# Patient Record
Sex: FEMALE | Race: WHITE | NOT HISPANIC OR LATINO | URBAN - METROPOLITAN AREA
[De-identification: names, ages, dates, MRNs, and addresses within clinical notes are randomized per-mention and may not be internally consistent; named-entity substitution may affect disease eponyms.]

---

## 2024-04-23 ENCOUNTER — TRANSCRIBE ORDERS (OUTPATIENT)
Dept: ADMINISTRATIVE | Facility: HOSPITAL | Age: 44
End: 2024-04-23
Payer: COMMERCIAL

## 2024-04-23 DIAGNOSIS — Z12.31 VISIT FOR SCREENING MAMMOGRAM: Primary | ICD-10-CM

## 2024-05-03 ENCOUNTER — HOSPITAL ENCOUNTER (OUTPATIENT)
Dept: MAMMOGRAPHY | Facility: HOSPITAL | Age: 44
Discharge: HOME | End: 2024-05-03
Admitting: FAMILY MEDICINE
Payer: COMMERCIAL

## 2024-05-03 DIAGNOSIS — Z12.31 VISIT FOR SCREENING MAMMOGRAM: ICD-10-CM

## 2024-05-03 PROCEDURE — 77067 SCR MAMMO BI INCL CAD: CPT

## 2024-05-03 PROCEDURE — 77063 BREAST TOMOSYNTHESIS BI: CPT

## 2024-05-15 NOTE — PROGRESS NOTES
"Subjective   History of Present Illness: Glenys Cornejo is a 44 y.o. female is being seen for consultation today at the request of TERE Bear for numbness in her left leg that began in December of 2023 after long flight, 17 hours, to [place].  She started to seek attention for the numb and weak left leg that developed because it did not seem to be improving but has improved with physical therapy over the last 3 weeks.  She tells me a month ago she had a profound foot drop on the left but now she can definitely lift the leg against gravity and has resistance in the left foot.  Numbness still does persist on the lateral surface of the left foot.  She denies any back or leg pain.     History of Present Illness    Tobacco Use: Medium Risk (5/17/2024)    Patient History     Smoking Tobacco Use: Former     Smokeless Tobacco Use: Never     Passive Exposure: Not on file        The following portions of the patient's history were reviewed and updated as appropriate: allergies, current medications, past family history, past medical history, past social history, past surgical history and problem list.    Review of Systems    Objective     Vitals:    05/17/24 0920   BP: 120/78   Weight: 76.7 kg (169 lb)   Height: 175.3 cm (69\")     Body mass index is 24.96 kg/m².      Physical Exam  Neurologic Exam    Physical Exam:    CONSTITUTIONAL: This 44 year old  female appears well developed, well-nourished and in no acute distress.  When I entered the room the patient was sitting in the exam chair with her left leg crossed across her right leg comfortably    HEAD & FACE: the head and face are symmetric, normocephalic and atraumatic.    EYES: Inspection of the conjunctivae and lids reveals no swelling, erythema or discharge.  Pupils are round, equal and reactive to light and there is no scleral icterus.    EARS, NOSE, MOUTH & THROAT: On inspection, the ears and nose are within normal limits.    NECK: the neck is " supple and symmetric. The trachea is midline with no masses.    PULMONARY: Respiratory effort is normal with no increased work of breathing or signs of respiratory distress.    CARDIOVASCULAR: Pedal pulses are +2/4 bilaterally. Examination of the extremities shows no edema or varicosities.    MUSCULOSKELETAL: Gait and station are within normal limits. The spine has normal alignment and range of motion.    SKIN: The skin is warm, dry and intact    NEUROLOGIC:   Cranial Nerves 2-12 intact  Motor strength reveals 4/5 strength in left dorsiflexion and strong/normal eversion. Muscle bulk and tone are normal.  Sensory exam is diminished with some dysesthesia in the lateral surface of the left foot and ankle  Reflexes on the right side demonstrates 2/4 Knee Jerk Reflex, 2/4 Ankle Jerk Reflex and no ankle clonus on the right.   Reflexes on the left side demonstrates 2/4 Knee Jerk Reflex, 2/4 Ankle Jerk Reflex and no ankle clonus on the left.  Superficial/Primitive Reflexes: primitive reflexes were absent.  Gatica's, Babinski, and Clonus signs all negative.  No coordination deficit observed.  Radicular testing showed a negative Charles (SHARON) test and negative straight leg raise.  Cortical function is intact and without deficits. Speech is normal.    PSYCHIATRIC: oriented to person, place and time. Patient's mood and affect are normal.    Assessment & Plan   Independent Review of Radiographic Studies:      I personally reviewed the images from the following studies.    MRI of the lumbar spine done at [facility] on 4/12/2024 reveals degenerative disc disease in the lumbar spine throughout.  There is only mild central canal stenosis but due to moderate facet arthropathy there is mild to moderate bilateral neuroforaminal stenosis at L4-5 and L5-S1.          Medical Decision Making:      Patient describes a prolonged seated position on an airplane in December and following that lengthy trip she developed numbness and  "weakness in the lower leg on the left.  She describes she had a profound foot drop for several months with numbness.  She denies any back pain or left lower extremity pain.  The numbness and weakness are starting to improve and she has good resistance strength in the left foot today physical therapy over the last 3 weeks.  I think she should continue the physical therapy for the peroneal palsy.  Her MRI lumbar is unrevealing for any threatening stenosis or structural abnormality.    I made note that when I entered the room she had her left leg crossed over the right leg and she does note that she is a habitual \"leg crosser\".  I suspect during the long flight she probably had her legs crossed during a significant portion of that time which caused an injury to the left peroneal nerve across the fibula.  I cautioned her in order to get the nerve to completely recover and avoid recurrent nerve injury she should avoid crossing her legs especially the left leg across the right.  She has an appointment later this summer with Dr. Vallejo which is probably a good idea to make sure that he does not have any other suggestions to promote nerve healing or avoid reinjury.    Return for any new or recurrent neurosurgical problems.    Diagnoses and all orders for this visit:    1. Peroneal nerve palsy, left (Primary)  -     Ambulatory Referral to Neurology             Wiley Winkler MD FACS [facility]  Neurological Surgery            "

## 2024-05-17 ENCOUNTER — OFFICE VISIT (OUTPATIENT)
Dept: NEUROSURGERY | Facility: CLINIC | Age: 44
End: 2024-05-17
Payer: COMMERCIAL

## 2024-05-17 VITALS
WEIGHT: 169 LBS | SYSTOLIC BLOOD PRESSURE: 120 MMHG | BODY MASS INDEX: 25.03 KG/M2 | HEIGHT: 69 IN | DIASTOLIC BLOOD PRESSURE: 78 MMHG

## 2024-05-17 DIAGNOSIS — G57.32 PERONEAL NERVE PALSY, LEFT: Primary | ICD-10-CM

## 2024-05-17 PROCEDURE — 99204 OFFICE O/P NEW MOD 45 MIN: CPT | Performed by: NEUROLOGICAL SURGERY

## 2024-05-17 RX ORDER — ERGOCALCIFEROL 1.25 MG/1
1 CAPSULE ORAL WEEKLY
COMMUNITY
Start: 2024-04-23

## 2024-05-17 RX ORDER — UREA 10 %
1 LOTION (ML) TOPICAL DAILY
COMMUNITY
Start: 2024-04-23